# Patient Record
Sex: MALE | Race: WHITE | NOT HISPANIC OR LATINO | Employment: FULL TIME | URBAN - METROPOLITAN AREA
[De-identification: names, ages, dates, MRNs, and addresses within clinical notes are randomized per-mention and may not be internally consistent; named-entity substitution may affect disease eponyms.]

---

## 2020-09-24 ENCOUNTER — TELEPHONE (OUTPATIENT)
Dept: OPHTHALMOLOGY | Facility: CLINIC | Age: 30
End: 2020-09-24

## 2020-09-24 NOTE — TELEPHONE ENCOUNTER
Spoke to patient and scheduled him to see Dr Bowen in October -TD       ----- Message from Paola Joseph sent at 9/24/2020  1:56 PM CDT -----  Contact: Melecio Evans  Patient is calling to schedule an appointment with Dr. Mesa for Strabismus. Patient contact number  is [phone].

## 2020-10-09 ENCOUNTER — OFFICE VISIT (OUTPATIENT)
Dept: OPHTHALMOLOGY | Facility: CLINIC | Age: 30
End: 2020-10-09
Payer: OTHER GOVERNMENT

## 2020-10-09 DIAGNOSIS — H50.21 HYPERTROPIA OF RIGHT EYE: Primary | ICD-10-CM

## 2020-10-09 DIAGNOSIS — H49.11 4TH NERVE PALSY, RIGHT: ICD-10-CM

## 2020-10-09 PROCEDURE — 92004 PR EYE EXAM, NEW PATIENT,COMPREHESV: ICD-10-PCS | Mod: S$PBB,,, | Performed by: STUDENT IN AN ORGANIZED HEALTH CARE EDUCATION/TRAINING PROGRAM

## 2020-10-09 PROCEDURE — 92004 COMPRE OPH EXAM NEW PT 1/>: CPT | Mod: S$PBB,,, | Performed by: STUDENT IN AN ORGANIZED HEALTH CARE EDUCATION/TRAINING PROGRAM

## 2020-10-09 PROCEDURE — 92060 SENSORIMOTOR EXAMINATION: CPT | Mod: 26,S$PBB,, | Performed by: STUDENT IN AN ORGANIZED HEALTH CARE EDUCATION/TRAINING PROGRAM

## 2020-10-09 PROCEDURE — 99999 PR PBB SHADOW E&M-EST. PATIENT-LVL I: CPT | Mod: PBBFAC,,, | Performed by: STUDENT IN AN ORGANIZED HEALTH CARE EDUCATION/TRAINING PROGRAM

## 2020-10-09 PROCEDURE — 92060 SENSORIMOTOR EXAMINATION: CPT | Mod: PBBFAC | Performed by: STUDENT IN AN ORGANIZED HEALTH CARE EDUCATION/TRAINING PROGRAM

## 2020-10-09 PROCEDURE — 92060 PR SPECIAL EYE EVAL,SENSORIMOTOR: ICD-10-PCS | Mod: 26,S$PBB,, | Performed by: STUDENT IN AN ORGANIZED HEALTH CARE EDUCATION/TRAINING PROGRAM

## 2020-10-09 PROCEDURE — 99211 OFF/OP EST MAY X REQ PHY/QHP: CPT | Mod: PBBFAC,25 | Performed by: STUDENT IN AN ORGANIZED HEALTH CARE EDUCATION/TRAINING PROGRAM

## 2020-10-09 PROCEDURE — 99999 PR PBB SHADOW E&M-EST. PATIENT-LVL I: ICD-10-PCS | Mod: PBBFAC,,, | Performed by: STUDENT IN AN ORGANIZED HEALTH CARE EDUCATION/TRAINING PROGRAM

## 2020-10-09 NOTE — PROGRESS NOTES
HPI     31 yo male her today for strab eval. Pt complains of double vision since   child landry, but wears prism glasses to correct. Pt states the eyes tend to   drift up and down and he would like to know what can surgically done to   correct the deviation.     No other ocular complaints  No ocular medications.   No ocular surgeries.     Last edited by Laura Timmons MA on 10/9/2020  8:56 AM. (History)            Assessment /Plan     For exam results, see Encounter Report.    Hypertropia of right eye    4th nerve palsy, right      Discussed findings with patient today in detail   Explained surgical correction and procedure in detail.   Made patient aware that without surgical correction, double vision could get worse, but that we can operate at any age.  Patient does not chose to move forward with surgery at this time and would like to continue in glasses as he has moderate control of deviation with head turn and tilt with prism glasses.       RTC PRN    This service was scribed by Donna Shea for and in the presence of Dr. Bowen who personally performed this service.    Donna Shea COA    Bree Bowen MD